# Patient Record
Sex: MALE | Race: WHITE | Employment: OTHER | URBAN - METROPOLITAN AREA
[De-identification: names, ages, dates, MRNs, and addresses within clinical notes are randomized per-mention and may not be internally consistent; named-entity substitution may affect disease eponyms.]

---

## 2022-03-30 ENCOUNTER — OFFICE VISIT (OUTPATIENT)
Dept: VASCULAR SURGERY | Age: 62
End: 2022-03-30
Payer: OTHER GOVERNMENT

## 2022-03-30 VITALS
TEMPERATURE: 98.6 F | BODY MASS INDEX: 35.14 KG/M2 | WEIGHT: 223.9 LBS | RESPIRATION RATE: 18 BRPM | DIASTOLIC BLOOD PRESSURE: 98 MMHG | HEIGHT: 67 IN | SYSTOLIC BLOOD PRESSURE: 153 MMHG | HEART RATE: 81 BPM

## 2022-03-30 DIAGNOSIS — I83.813 VARICOSE VEINS OF BILATERAL LOWER EXTREMITIES WITH PAIN: Primary | ICD-10-CM

## 2022-03-30 PROCEDURE — 99204 OFFICE O/P NEW MOD 45 MIN: CPT | Performed by: INTERNAL MEDICINE

## 2022-03-30 PROCEDURE — 99213 OFFICE O/P EST LOW 20 MIN: CPT | Performed by: INTERNAL MEDICINE

## 2022-03-30 RX ORDER — LISINOPRIL 40 MG/1
40 TABLET ORAL DAILY
COMMUNITY

## 2022-03-30 RX ORDER — M-VIT,TX,IRON,MINS/CALC/FOLIC 27MG-0.4MG
1 TABLET ORAL DAILY
COMMUNITY

## 2022-03-30 RX ORDER — TRIAMTERENE AND HYDROCHLOROTHIAZIDE 37.5; 25 MG/1; MG/1
1 TABLET ORAL DAILY
COMMUNITY

## 2022-03-30 RX ORDER — LORATADINE 10 MG/1
TABLET ORAL
COMMUNITY

## 2022-03-30 RX ORDER — HYDROCHLOROTHIAZIDE 25 MG/1
25 TABLET ORAL DAILY
COMMUNITY

## 2022-03-30 NOTE — PATIENT INSTRUCTIONS
SURVEY:    You may be receiving a survey from [employer] regarding your visit today. Please complete the survey to enable us to provide the highest quality of care to you and your family. If you cannot score us a very good on any question, please call the office to discuss how we could have made your experience a very good one. Thank you.

## 2022-03-30 NOTE — PROGRESS NOTES
Britton Wallace was seen on 3/30/2022 for   Chief Complaint   Patient presents with   [facility] New Patient     Patient here today for painful varicose veins   . 3/30/22 1st [facility] visit. Lives in [facility] so this location is better than [place] or [place]. Gets care with Kimberly Bell MD at [facility]. Was in [facility]. [place]  Has had right leg procedures. No left. Had right vein stripping, several injections. Lots of care at [facility] 10/12/15 RF [place]. 11/11/19 foam,9/1/20 10/19/20 foam. \"I lost count\" of foam procedures. Thinks he had one on stomach, so presumably SSV. Improved by treatment. 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 Leg feels heavy, tingling, burning , feels heavy. Worse with standing. Doesn't walk much d/t torn meniscus. HTN  Overall health pretty good. Only belly hx is appy age 15. Not sure about vv in family. Only child. Social History     Socioeconomic History    Marital status: Single     Spouse name: Not on file    Number of children: Not on file    Years of education: Not on file    Highest education level: Not on file   Occupational History    Not on file   Tobacco Use    Smoking status: Never Smoker    Smokeless tobacco: Never Used   Substance and Sexual Activity    Alcohol use: Never    Drug use: Never    Sexual activity: Not on file   Other Topics Concern    Not on file   Social History Narrative    Not on file     Social Determinants of Health     Financial Resource Strain:     Difficulty of Paying Living Expenses: Not on file   Food Insecurity:     Worried About Running Out of Food in the Last Year: Not on file    Julia of Food in the Last Year: Not on file   Transportation Needs:     Lack of Transportation (Medical): Not on file    Lack of Transportation (Non-Medical):  Not on file   Physical Activity:     Days of Exercise per Week: Not on file    Minutes of Exercise per Session: Not on file   Stress:     Feeling of Stress : Not on file   Social Connections:     Frequency of Communication with Friends and Family: Not on file    Frequency of Social Gatherings with Friends and Family: Not on file    Attends Christianity Services: Not on file    Active Member of Clubs or Organizations: Not on file    Attends Club or Organization Meetings: Not on file    Marital Status: Not on file   Intimate Partner Violence:     Fear of Current or Ex-Partner: Not on file    Emotionally Abused: Not on file    Physically Abused: Not on file    Sexually Abused: Not on file   Housing Stability:     Unable to Pay for Housing in the Last Year: Not on file    Number of [place] in the Last Year: Not on file    Unstable Housing in the Last Year: Not on file     History reviewed. No pertinent family history. Past Medical History:   Diagnosis Date    Hypertension      Current Outpatient Medications   Medication Sig Dispense Refill    hydroCHLOROthiazide (HYDRODIURIL) 25 MG tablet Take 25 mg by mouth daily      triamterene-hydroCHLOROthiazide (MAXZIDE-25) 37.5-25 MG per tablet Take 1 tablet by mouth daily      lisinopril (PRINIVIL;ZESTRIL) 40 MG tablet Take 40 mg by mouth daily      GLUCOS-CHOND-MSM-BOR-D3-HYALUR PO Take by mouth      Multiple Vitamins-Minerals (THERAPEUTIC MULTIVITAMIN-MINERALS) tablet Take 1 tablet by mouth daily      loratadine (CLARITIN) 10 MG tablet Take by mouth       No current facility-administered medications for this visit. Physical findings:  General- no acute distress, oriented  HEENT - no xanthelasma, external ears normal  Neck- Supple, no thyromegaly  Carotids - no carotid bruits  Lungs - Clear to auscultation. CV- Regular rate and rythym, no murmurs rubs or gallops  Abdomen - Non tender, non distended, no bruits  Skin- warm, dry, no skin breakdown or gangrene  Extremities - 1+ edema, some right ankle hyperpigmentation. Prominent reticular veins both legs, and some vv right.  Right corona phlebectatica    Pulses Right - radial 2+  Posterior tibial:    absent  Dorsalis pedis:  2+     Pulses Left -radial 2+  Posterior tibial:    absent  Dorsalis pedis:  2+        Assessment:  Encounter Diagnosis   Name Primary?  Varicose veins of bilateral lower extremities with pain Yes     Recurrent vv right leg with pain refractory to compression and interfering with adl  Plan of care:  Right leg reflux study  rtc 3 wks  45 min chart review and pt encounter  Follow up and evaluate.        Electronically signed by Val Mckee MD on 3/30/22 at 11:42 AM EDT

## 2022-03-30 NOTE — PROGRESS NOTES
Sony Anna was seen on 3/30/2022 for   Chief Complaint   Patient presents with   [facility] New Patient     Patient here today for painful varicose veins   .                             REVIEW OF SYSTEMS    Constitutional Weight: absent, A, Fatigue: absent Fever: absent   HEENT Ears: normal,Visual disturbance: absent Sore throat: absent,    Respiratory Shortness of breath: absent, Cough: absent;, Snoring: absent   Cardivascular Chest pain: absent,  Leg pain: present,Leg swelling:present, Non-healing wound:present    GI Diarrhea: absent, Abdominal Pain: absent    Urinary frequency: absent, Urinary incontinence: absent   Musculoskeletal Neck pain: absent, Back pain: absent, Restless Leg:absent     Dermatological Hair loss: absent, Skin changes: absent   Neurological  Sudden Loss of Vision in one eye:absent, Slurred Speech:absent, Weakness on one side of body: absent,Headache: absent   Psychiatric Anxiety: absent, Depression: absent   Hematologic Abnormal bleeding: absent,

## 2022-04-13 ENCOUNTER — HOSPITAL ENCOUNTER (OUTPATIENT)
Dept: VASCULAR LAB | Age: 62
Discharge: HOME | End: 2022-04-15
Payer: OTHER GOVERNMENT

## 2022-04-13 DIAGNOSIS — I83.813 VARICOSE VEINS OF BILATERAL LOWER EXTREMITIES WITH PAIN: ICD-10-CM

## 2022-04-13 PROCEDURE — 93971 EXTREMITY STUDY: CPT

## 2022-04-20 ENCOUNTER — OFFICE VISIT (OUTPATIENT)
Dept: VASCULAR SURGERY | Age: 62
End: 2022-04-20
Payer: OTHER GOVERNMENT

## 2022-04-20 VITALS
TEMPERATURE: 99.1 F | SYSTOLIC BLOOD PRESSURE: 149 MMHG | RESPIRATION RATE: 18 BRPM | DIASTOLIC BLOOD PRESSURE: 84 MMHG | HEART RATE: 67 BPM | WEIGHT: 224 LBS | BODY MASS INDEX: 35.16 KG/M2 | HEIGHT: 67 IN

## 2022-04-20 DIAGNOSIS — I83.813 VARICOSE VEINS OF BILATERAL LOWER EXTREMITIES WITH PAIN: Primary | ICD-10-CM

## 2022-04-20 PROCEDURE — 99213 OFFICE O/P EST LOW 20 MIN: CPT | Performed by: INTERNAL MEDICINE

## 2022-04-20 ASSESSMENT — PATIENT HEALTH QUESTIONNAIRE - PHQ9
SUM OF ALL RESPONSES TO PHQ9 QUESTIONS 1 & 2: 0
1. LITTLE INTEREST OR PLEASURE IN DOING THINGS: 0
2. FEELING DOWN, DEPRESSED OR HOPELESS: 0
SUM OF ALL RESPONSES TO PHQ QUESTIONS 1-9: 0

## 2022-04-20 NOTE — PATIENT INSTRUCTIONS
SURVEY:    You may be receiving a survey from [employer] regarding your visit today. Please complete the survey to enable us to provide the highest quality of care to you and your family. If you cannot score us a very good on any question, please call the office to discuss how we could have made your experience a very good one. Thank you.

## 2022-04-20 NOTE — PROGRESS NOTES
Lauren Ndiaye was seen on 4/20/2022 for   Chief Complaint   Patient presents with    Follow-up     Here today for test results   .                             REVIEW OF SYSTEMS    Constitutional Weight: absent, A, Fatigue: absent Fever: absent   HEENT Ears: normal,Visual disturbance: absent Sore throat: absent,    Respiratory Shortness of breath: absent, Cough: absent;, Snoring: absent   Cardivascular Chest pain: absent,  Leg pain: present,Leg swelling:absent, Non-healing wound:absent    GI Diarrhea: absent, Abdominal Pain: absent    Urinary frequency: absent, Urinary incontinence: absent   Musculoskeletal Neck pain: absent, Back pain: absent, Restless Leg:absent     Dermatological Hair loss: absent, Skin changes: absent   Neurological  Sudden Loss of Vision in one eye:absent, Slurred Speech:absent, Weakness on one side of body: absent,Headache: absent   Psychiatric Anxiety: absent, Depression: absent   Hematologic Abnormal bleeding: absent,

## 2022-04-20 NOTE — PROGRESS NOTES
Porter Ayers was seen on 4/20/2022 for   Chief Complaint   Patient presents with    Follow-up     Here today for test results   . 3/30/22 1st [facility] visit. Lives in [facility] so this location is better than [facility] or . Gets care with Isi Soriano MD at [facility]. Was in [facility]. [place]  Has had right leg procedures. No left. Had right vein stripping, several injections. Lots of care at [facility] 10/12/15 [facility] [place]. 11/11/19 foam,9/1/20 10/19/20 foam. \"I lost count\" of foam procedures. Thinks he had one on stomach, so presumably SSV. Improved by treatment. 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 Worse with standing. Doesn't walk much d/t torn meniscus. HTN  Overall health pretty good. Only belly hx is appy age 15. Not sure about vv in family. Only child. 4/20/22 Still has heaviness in his legs. Duplex perormed here showed all saphenous veins and branches have been occluded. 2 perforators seen at calf and above ankle, 3.3 and 2.9 mm respectively. Social History     Socioeconomic History    Marital status: Single     Spouse name: Not on file    Number of children: Not on file    Years of education: Not on file    Highest education level: Not on file   Occupational History    Not on file   Tobacco Use    Smoking status: Never Smoker    Smokeless tobacco: Never Used   Substance and Sexual Activity    Alcohol use: Never    Drug use: Never    Sexual activity: Not on file   Other Topics Concern    Not on file   Social History Narrative    Not on file     Social Determinants of Health     Financial Resource Strain:     Difficulty of Paying Living Expenses: Not on file   Food Insecurity:     Worried About Running Out of Food in the Last Year: Not on file    Julia of Food in the Last Year: Not on file   Transportation Needs:     Lack of Transportation (Medical): Not on file    Lack of Transportation (Non-Medical):  Not on file   Physical Activity:  Days of Exercise per Week: Not on file    Minutes of Exercise per Session: Not on file   Stress:     Feeling of Stress : Not on file   Social Connections:     Frequency of Communication with Friends and Family: Not on file    Frequency of Social Gatherings with Friends and Family: Not on file    Attends Mosque Services: Not on file    Active Member of [address] [place] [address] or Organizations: Not on file    Attends Club or Organization Meetings: Not on file    Marital Status: Not on file   Intimate Partner Violence:     Fear of Current or Ex-Partner: Not on file    Emotionally Abused: Not on file    Physically Abused: Not on file    Sexually Abused: Not on file   Housing Stability:     Unable to Pay for Housing in the Last Year: Not on file    Number of [place] in the Last Year: Not on file    Unstable Housing in the Last Year: Not on file     History reviewed. No pertinent family history. Past Medical History:   Diagnosis Date    Hypertension      Current Outpatient Medications   Medication Sig Dispense Refill    loratadine (CLARITIN) 10 MG tablet Take by mouth      hydroCHLOROthiazide (HYDRODIURIL) 25 MG tablet Take 25 mg by mouth daily      triamterene-hydroCHLOROthiazide (MAXZIDE-25) 37.5-25 MG per tablet Take 1 tablet by mouth daily      lisinopril (PRINIVIL;ZESTRIL) 40 MG tablet Take 40 mg by mouth daily      GLUCOS-CHOND-MSM-BOR-D3-HYALUR PO Take by mouth      Multiple Vitamins-Minerals (THERAPEUTIC MULTIVITAMIN-MINERALS) tablet Take 1 tablet by mouth daily       No current facility-administered medications for this visit. Physical findings:  General- no acute distress, oriented  HEENT - no xanthelasma, external ears normal  Neck- Supple, no thyromegaly  Extremities - 2+ edema      Assessment:  Encounter Diagnosis   Name Primary?     Varicose veins of bilateral lower extremities with pain Yes     No current problematic veins    Plan of care:  RTC 1 yr  25 min chart review and pt encounter  Follow up and evaluate.        Electronically signed by Nabil Fregoso MD on 4/20/22 at 1:32 PM EDT

## 2023-05-17 ENCOUNTER — OFFICE VISIT (OUTPATIENT)
Dept: VASCULAR SURGERY | Age: 63
End: 2023-05-17
Payer: OTHER GOVERNMENT

## 2023-05-17 VITALS
SYSTOLIC BLOOD PRESSURE: 149 MMHG | HEART RATE: 48 BPM | RESPIRATION RATE: 18 BRPM | TEMPERATURE: 97 F | BODY MASS INDEX: 34.92 KG/M2 | HEIGHT: 67 IN | DIASTOLIC BLOOD PRESSURE: 77 MMHG | WEIGHT: 222.5 LBS

## 2023-05-17 DIAGNOSIS — I83.813 VARICOSE VEINS OF BILATERAL LOWER EXTREMITIES WITH PAIN: Primary | ICD-10-CM

## 2023-05-17 PROCEDURE — 99213 OFFICE O/P EST LOW 20 MIN: CPT | Performed by: INTERNAL MEDICINE

## 2023-05-17 NOTE — PATIENT INSTRUCTIONS
SURVEY:    You may be receiving a survey from [facility] regarding your visit today. Please complete the survey to enable us to provide the highest quality of care to you and your family. If you cannot score us a very good on any question, please call the office to discuss how we could have made your experience a very good one. Thank you.

## 2023-05-17 NOTE — PROGRESS NOTES
Activity: Not on file   Stress: Not on file   Social Connections: Not on file   Intimate Partner Violence: Not on file   Housing Stability: Not on file     History reviewed. No pertinent family history. Past Medical History:   Diagnosis Date    Hypertension      Current Outpatient Medications   Medication Sig Dispense Refill    loratadine (CLARITIN) 10 MG tablet Take by mouth      triamterene-hydroCHLOROthiazide (MAXZIDE-25) 37.5-25 MG per tablet Take 1 tablet by mouth daily      lisinopril (PRINIVIL;ZESTRIL) 40 MG tablet Take 1 tablet by mouth daily      hydroCHLOROthiazide (HYDRODIURIL) 25 MG tablet Take 25 mg by mouth daily (Patient not taking: Reported on 5/17/2023)      GLUCOS-CHOND-MSM-BOR-D3-HYALUR PO Take by mouth (Patient not taking: Reported on 5/17/2023)      Multiple Vitamins-Minerals (THERAPEUTIC MULTIVITAMIN-MINERALS) tablet Take 1 tablet by mouth daily (Patient not taking: Reported on 5/17/2023)       No current facility-administered medications for this visit. Physical findings:  General- no acute distress, oriented  HEENT - no xanthelasma, external ears normal  Neck- Supple, no thyromegaly  Skin- warm, dry, no skin breakdown or gangrene  Extremities - 2+ edema, firm 2 cm chord right post calf, 2  mm diameter      Assessment:  1. Varicose veins of bilateral lower extremities with pain       Probably clotted a small vein  No treatment necessary  Rtc 1 yr   25 min chart review and pt encounter  Plan of care: Follow up and evaluate.        Electronically signed by Che Harrison MD on 5/17/23 at 1:21 PM EDT                            REVIEW OF SYSTEMS    Constitutional Weight: absent, A, Fatigue: absent Fever: absent   HEENT Ears: normal,Visual disturbance: absent Sore throat: absent,    Respiratory Shortness of breath: absent, Cough: absent;, Snoring: absent   Cardivascular Chest pain: absent,  Leg pain: absent,Leg swelling:absent, Non-healing wound:absent    GI Diarrhea: absent, Abdominal

## 2024-05-15 ENCOUNTER — OFFICE VISIT (OUTPATIENT)
Dept: VASCULAR SURGERY | Age: 64
End: 2024-05-15
Payer: OTHER GOVERNMENT

## 2024-05-15 VITALS
SYSTOLIC BLOOD PRESSURE: 142 MMHG | TEMPERATURE: 97.6 F | RESPIRATION RATE: 18 BRPM | WEIGHT: 219.7 LBS | HEART RATE: 73 BPM | DIASTOLIC BLOOD PRESSURE: 84 MMHG | BODY MASS INDEX: 34.48 KG/M2 | HEIGHT: 67 IN

## 2024-05-15 DIAGNOSIS — I83.813 VARICOSE VEINS OF BILATERAL LOWER EXTREMITIES WITH PAIN: Primary | ICD-10-CM

## 2024-05-15 PROCEDURE — 99213 OFFICE O/P EST LOW 20 MIN: CPT | Performed by: INTERNAL MEDICINE

## 2024-05-15 NOTE — PROGRESS NOTES
Tommie Polanco was seen on 5/15/2024 for   Chief Complaint   Patient presents with    Varicose Veins     1 year follow up. Patient reports a bulging vein on right calf which itches. Denies leg pain or swelling.    .  3/30/22 1st [facility] visit. Lives in [facility] so this location is better than [facility] or . Gets care with Glenis Marks MD at [facility]. Was in [facility]. [place]  Has had right leg procedures. No left. Had right vein stripping, several injections.   Lots of care at [facility] 10/12/15 RF [place]. 11/11/19 foam,9/1/20 10/19/20 foam. \"I lost count\" of foam procedures. Thinks he had one on stomach, so presumably SSV. Improved by treatment.  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 Worse with standing. Doesn't walk much d/t torn meniscus.   HTN  Overall health pretty good.  Only belly hx is appy age 13.  Not sure about vv in family. Only child.  4/20/22 Still has heaviness in his legs. Duplex perormed here showed all saphenous veins and branches have been occluded. 2 perforators seen at calf and above ankle, 3.3 and 2.9 mm respectively.  UPDATE 5/17/23 Over last few weeks he noticed a hard area in right posterior calf. It is improving.  UPDATE 5/15/24 Still has some discomfort in vein right posterior calf. Itches.Other sx unchanged as well.     Social History     Socioeconomic History    Marital status: Single     Spouse name: Not on file    Number of children: Not on file    Years of education: Not on file    Highest education level: Not on file   Occupational History    Not on file   Tobacco Use    Smoking status: Never    Smokeless tobacco: Never   Substance and Sexual Activity    Alcohol use: Never    Drug use: Never    Sexual activity: Not on file   Other Topics Concern    Not on file   Social History Narrative    Not on file     Social Determinants of Health     Financial Resource Strain: Not on file   Food Insecurity: Not on file   Transportation Needs: Not on

## 2024-05-15 NOTE — PROGRESS NOTES
Tommie Polanco was seen on 5/15/2024 for   Chief Complaint   Patient presents with    Varicose Veins     1 year follow up. Patient reports a bulging vein on right calf which itches. Denies leg pain or swelling.    .                            REVIEW OF SYSTEMS    Constitutional Weight: absent, A, Fatigue: absent Fever: absent   HEENT Ears: normal,Visual disturbance: absent Sore throat: absent,    Respiratory Shortness of breath: absent, Cough: absent;, Snoring: absent   Cardivascular Chest pain: absent,  Leg pain: absent,Leg swelling:absent, Non-healing wound:absent    GI Diarrhea: absent, Abdominal Pain: absent    Urinary frequency: absent, Urinary incontinence: absent   Musculoskeletal Neck pain: absent, Back pain: absent, Restless Leg:absent     Dermatological Hair loss: absent, Skin changes: absent   Neurological  Sudden Loss of Vision in one eye:absent, Slurred Speech:absent, Weakness on one side of body: absent,Headache: absent   Psychiatric Anxiety: absent, Depression: absent   Hematologic Abnormal bleeding: absent,

## 2024-05-15 NOTE — PATIENT INSTRUCTIONS
SURVEY:    Thank you for allowing us to care for you today.    You may be receiving a survey from [facility] regarding your visit today- electronically or via mail.      Please help us by completing the survey as this will provide the needed feedback to ensure we are providing the very best care for you and your family.    If you cannot score us a very good on any question, please call the office to discuss how we could have made your experience a very good one.    Thank you.       STAFF:    Gracie Ladd, Jena Ovalle, Charissa Maher      CLINICAL STAFF:    Radha Hernandez LPN, Flores Freeman LPN, Elza Ferrer LPN, Bekah Connelly CMA